# Patient Record
Sex: FEMALE | Race: BLACK OR AFRICAN AMERICAN | NOT HISPANIC OR LATINO | URBAN - METROPOLITAN AREA
[De-identification: names, ages, dates, MRNs, and addresses within clinical notes are randomized per-mention and may not be internally consistent; named-entity substitution may affect disease eponyms.]

---

## 2020-01-08 ENCOUNTER — APPOINTMENT (RX ONLY)
Dept: URBAN - METROPOLITAN AREA OTHER 10 | Facility: OTHER | Age: 70
Setting detail: DERMATOLOGY
End: 2020-01-08

## 2020-01-08 DIAGNOSIS — D485 NEOPLASM OF UNCERTAIN BEHAVIOR OF SKIN: ICD-10-CM

## 2020-01-08 PROBLEM — D48.5 NEOPLASM OF UNCERTAIN BEHAVIOR OF SKIN: Status: ACTIVE | Noted: 2020-01-08

## 2020-01-08 PROBLEM — K21.9 GASTRO-ESOPHAGEAL REFLUX DISEASE WITHOUT ESOPHAGITIS: Status: ACTIVE | Noted: 2020-01-08

## 2020-01-08 PROBLEM — I10 ESSENTIAL (PRIMARY) HYPERTENSION: Status: ACTIVE | Noted: 2020-01-08

## 2020-01-08 PROCEDURE — ? BIOPSY BY SHAVE METHOD

## 2020-01-08 PROCEDURE — 11102 TANGNTL BX SKIN SINGLE LES: CPT

## 2020-01-08 ASSESSMENT — LOCATION ZONE DERM: LOCATION ZONE: LIP

## 2020-01-08 ASSESSMENT — LOCATION SIMPLE DESCRIPTION DERM: LOCATION SIMPLE: RIGHT LIP

## 2020-01-08 ASSESSMENT — LOCATION DETAILED DESCRIPTION DERM: LOCATION DETAILED: RIGHT UPPER CUTANEOUS LIP

## 2020-01-08 NOTE — PROCEDURE: BIOPSY BY SHAVE METHOD
Hide Topical Anesthesia?: No
Curettage Text: The wound bed was treated with curettage after the biopsy was performed.
Biopsy Method: Dermablade
Hemostasis: Aluminum Chloride and Electrocautery
Wound Care: No ointment
Lab Facility: [id number]
Additional Anesthesia Volume In Cc (Will Not Render If 0): 0
Cryotherapy Text: The wound bed was treated with cryotherapy after the biopsy was performed.
Billing Type: Patient Bill
Lab: [id number]
Depth Of Biopsy: dermis
Anesthesia Type: 1% lidocaine with 1:200,000 epinephrine
Anesthesia Volume In Cc: 0.3
Type Of Destruction Used: Curettage
Electrodesiccation And Curettage Text: The wound bed was treated with electrodesiccation and curettage after the biopsy was performed.
Notification Instructions: Patient will be notified of biopsy results. However, patient instructed to call the office if not contacted within 2 weeks.
Biopsy Type: H and E
Electrodesiccation Text: The wound bed was treated with electrodesiccation after the biopsy was performed.
Post-Care Instructions: I reviewed with the patient in detail post-care instructions. Patient is to keep the biopsy site dry overnight, and then apply bacitracin twice daily until healed. Patient may apply hydrogen peroxide soaks to remove any crusting.
Silver Nitrate Text: The wound bed was treated with silver nitrate after the biopsy was performed.
Was A Bandage Applied: Yes
Dressing: bandage
Consent: Written consent was obtained and risks were reviewed including but not limited to scarring, infection, bleeding, scabbing, incomplete removal, nerve damage and allergy to anesthesia.
Detail Level: Detailed

## 2020-01-29 ENCOUNTER — APPOINTMENT (RX ONLY)
Dept: URBAN - METROPOLITAN AREA CLINIC 12 | Facility: CLINIC | Age: 70
Setting detail: DERMATOLOGY
End: 2020-01-29

## 2020-01-29 PROBLEM — C44.01 BASAL CELL CARCINOMA OF SKIN OF LIP: Status: ACTIVE | Noted: 2020-01-29

## 2020-01-29 PROCEDURE — ? OTHER (COSMETIC)

## 2020-01-29 PROCEDURE — ? PATIENT SPECIFIC COUNSELING

## 2020-01-29 PROCEDURE — ? COUNSELING - BCC

## 2020-01-29 NOTE — PROCEDURE: PATIENT SPECIFIC COUNSELING
Detail Level: Zone
Other (Free Text): Dr. Walls requested copy of lab results from pcp \\n\\n\\nPatient presents with nasal cell on her upper lip patient is present with her family who state she does not have insurance and would like to get a cash quote for the closure. Dr. Walls stepped in and explained that her procedure will be done in the office shortly after Dr. Garcia removes the cancer. Explained that he will not know exactly what he will do until presented with the defect explained that he  may be able to close her with a straight line or flap, explains that if the wound is much bigger, she may have to go to the operating room for a more invasive procedure. explained that her mouth may be a little bit smaller after the procedure is done, and discussed down time.  Explained that she will have a scar that will be socially noticeable. \\n\\nDana stepped in to give a quote.

## 2020-01-29 NOTE — HPI: MOHS RECONSTRUCTION SINGLE DEFECT EVALUATION
How Many Reconstruction Sites Are To Be Evaluated?: 1
Is This A New Presentation, Presentation For Surgery, Or A Follow-Up?: Mohs Reconstruction Consultation
What Is The Location Of This Site?: Upper lip

## 2020-01-29 NOTE — PROCEDURE: OTHER (COSMETIC)
Other (Free Text): Patient is self pay
Detail Level: Zone
Price $ (Use Numbers Only, No Text Please.): 120.00

## 2020-02-04 ENCOUNTER — APPOINTMENT (RX ONLY)
Dept: OTHER 1 | Facility: OTHER | Age: 70
Setting detail: DERMATOLOGY
End: 2020-02-04

## 2020-02-10 ENCOUNTER — APPOINTMENT (RX ONLY)
Dept: URBAN - METROPOLITAN AREA OTHER 9 | Facility: OTHER | Age: 70
Setting detail: DERMATOLOGY
End: 2020-02-10

## 2020-02-10 PROBLEM — C44.01 BASAL CELL CARCINOMA OF SKIN OF LIP: Status: ACTIVE | Noted: 2020-02-10

## 2020-02-10 PROCEDURE — ? TREATMENT REGIMEN

## 2020-02-10 PROCEDURE — 99024 POSTOP FOLLOW-UP VISIT: CPT

## 2020-02-10 PROCEDURE — ? CONSULTATION FOR MOHS SURGERY

## 2020-02-10 NOTE — HPI: MOHS SURGERY CONSULTATION
Has The Cancer Been Biopsied Before?: has been previously biopsied
Body Location Override (Optional): Right upper cutaneous lip
Accession (Optional): [id number]
Who Is Your Referring Provider?: Tita Rosales
When Was Your Biopsy?: 1/8/20

## 2020-02-10 NOTE — PROCEDURE: TREATMENT REGIMEN
Detail Level: Simple
Plan: Discussed treatment options. Advised patient that [facility] will need to verify insurance, before treatment. Possible MOHs  with  Dr Garcia and closure with Dr Walls. Option # 2 Dr Walls will do

## 2020-02-10 NOTE — PROCEDURE: CONSULTATION FOR MOHS SURGERY
Detail Level: Detailed
Size Of Lesion: 2.5
X Size Of Lesion In Cm (Optional): 3
Incorporate Mauc In Note: Yes

## 2020-03-05 ENCOUNTER — RX ONLY (OUTPATIENT)
Age: 70
Setting detail: RX ONLY
End: 2020-03-05

## 2020-03-09 ENCOUNTER — APPOINTMENT (RX ONLY)
Dept: URBAN - METROPOLITAN AREA CLINIC 12 | Facility: CLINIC | Age: 70
Setting detail: DERMATOLOGY
End: 2020-03-09

## 2020-03-09 DIAGNOSIS — Z48.89 ENCOUNTER FOR OTHER SPECIFIED SURGICAL AFTERCARE: ICD-10-CM

## 2020-03-09 PROCEDURE — ? COUNSELING - POST-OP CHECK

## 2020-03-09 PROCEDURE — 99024 POSTOP FOLLOW-UP VISIT: CPT

## 2020-03-09 PROCEDURE — ? POST-OP CHECK

## 2020-03-09 PROCEDURE — ? PATIENT SPECIFIC COUNSELING

## 2020-03-09 ASSESSMENT — LOCATION DETAILED DESCRIPTION DERM: LOCATION DETAILED: PHILTRUM

## 2020-03-09 ASSESSMENT — LOCATION ZONE DERM: LOCATION ZONE: LIP

## 2020-03-09 ASSESSMENT — LOCATION SIMPLE DESCRIPTION DERM: LOCATION SIMPLE: UPPER LIP

## 2020-03-09 NOTE — PROCEDURE: PATIENT SPECIFIC COUNSELING
Detail Level: Simple
Other (Free Text): Patient presents s/p basal cell carcinoma repair on the upper lip. Dr. Walls stated that everything looked good, with no signs of bleeding or infection. Dr. Walls advised the patient to apply Vaseline to the area everyday. The patIent is to RTC in 1 week for suture removal and further evaluation and management. The patient has subsequent surgery schedule on April 5th.

## 2020-03-16 ENCOUNTER — APPOINTMENT (RX ONLY)
Dept: URBAN - METROPOLITAN AREA CLINIC 12 | Facility: CLINIC | Age: 70
Setting detail: DERMATOLOGY
End: 2020-03-16

## 2020-03-16 DIAGNOSIS — Z48.89 ENCOUNTER FOR OTHER SPECIFIED SURGICAL AFTERCARE: ICD-10-CM

## 2020-03-16 PROCEDURE — ? COUNSELING - POST-OP CHECK

## 2020-03-16 PROCEDURE — ? POST-OP CHECK

## 2020-03-16 PROCEDURE — ? PATIENT SPECIFIC COUNSELING

## 2020-03-16 PROCEDURE — ? SUTURE REMOVAL

## 2020-03-16 PROCEDURE — 99024 POSTOP FOLLOW-UP VISIT: CPT

## 2020-03-16 ASSESSMENT — LOCATION DETAILED DESCRIPTION DERM
LOCATION DETAILED: RIGHT UPPER CUTANEOUS LIP
LOCATION DETAILED: PHILTRUM

## 2020-03-16 ASSESSMENT — LOCATION ZONE DERM: LOCATION ZONE: LIP

## 2020-03-16 ASSESSMENT — LOCATION SIMPLE DESCRIPTION DERM
LOCATION SIMPLE: UPPER LIP
LOCATION SIMPLE: RIGHT LIP

## 2020-03-16 NOTE — PROCEDURE: PATIENT SPECIFIC COUNSELING
Detail Level: Simple
Other (Free Text): Patient presents s/p basal cell carcinoma repair on the upper lip. Dr. Walls stated that everything looked good, with no signs of bleeding or infection. Sutures removed today. Photo obtained. Will schedule next surgery in 2 weeks barring any unforeseen coronavirus delays.

## 2020-04-09 ENCOUNTER — APPOINTMENT (RX ONLY)
Dept: URBAN - METROPOLITAN AREA CLINIC 12 | Facility: CLINIC | Age: 70
Setting detail: DERMATOLOGY
End: 2020-04-09

## 2020-04-09 DIAGNOSIS — Z48.89 ENCOUNTER FOR OTHER SPECIFIED SURGICAL AFTERCARE: ICD-10-CM

## 2020-04-09 PROCEDURE — ? PATIENT SPECIFIC COUNSELING

## 2020-04-09 PROCEDURE — ? POST-OP CHECK

## 2020-04-09 PROCEDURE — ? COUNSELING - POST-OP CHECK

## 2020-04-09 PROCEDURE — ? SUTURE REMOVAL

## 2020-04-09 PROCEDURE — 99024 POSTOP FOLLOW-UP VISIT: CPT

## 2020-04-09 ASSESSMENT — LOCATION SIMPLE DESCRIPTION DERM
LOCATION SIMPLE: RIGHT LIP
LOCATION SIMPLE: UPPER LIP

## 2020-04-09 ASSESSMENT — LOCATION DETAILED DESCRIPTION DERM
LOCATION DETAILED: RIGHT UPPER CUTANEOUS LIP
LOCATION DETAILED: PHILTRUM

## 2020-04-09 ASSESSMENT — LOCATION ZONE DERM: LOCATION ZONE: LIP

## 2020-04-09 NOTE — PROCEDURE: PATIENT SPECIFIC COUNSELING
Other (Free Text): Patient presents s/p basal cell carcinoma repair on the upper lip. Patient is present with her son who is translating. Dr. Walls explained that everything looks good, patient has a normal amount of swelling, patient made aware that all her sutures are dissolvable advised to use ice to help with swelling. Tylenol to help with pain and peridex swish and spit to help keep her mouth clean. Patient advised to follow up with us in 2 weeks.
Detail Level: Simple

## 2020-04-28 ENCOUNTER — APPOINTMENT (RX ONLY)
Dept: URBAN - METROPOLITAN AREA CLINIC 12 | Facility: CLINIC | Age: 70
Setting detail: DERMATOLOGY
End: 2020-04-28

## 2020-04-28 DIAGNOSIS — Z48.89 ENCOUNTER FOR OTHER SPECIFIED SURGICAL AFTERCARE: ICD-10-CM

## 2020-04-28 PROCEDURE — ? PATIENT SPECIFIC COUNSELING

## 2020-04-28 PROCEDURE — ? COUNSELING - POST-OP CHECK

## 2020-04-28 PROCEDURE — ? POST-OP CHECK

## 2020-04-28 PROCEDURE — 99024 POSTOP FOLLOW-UP VISIT: CPT

## 2020-04-28 PROCEDURE — ? SUTURE REMOVAL

## 2020-04-28 ASSESSMENT — LOCATION SIMPLE DESCRIPTION DERM
LOCATION SIMPLE: UPPER LIP
LOCATION SIMPLE: RIGHT LIP

## 2020-04-28 ASSESSMENT — LOCATION DETAILED DESCRIPTION DERM
LOCATION DETAILED: RIGHT UPPER CUTANEOUS LIP
LOCATION DETAILED: PHILTRUM

## 2020-04-28 ASSESSMENT — LOCATION ZONE DERM: LOCATION ZONE: LIP

## 2020-04-28 NOTE — PROCEDURE: PATIENT SPECIFIC COUNSELING
Other (Free Text): Patient presents s/p basal cell carcinoma repair on the upper lip. Patient is present with her son who is translating. Son states she been experiencing some mild pain. Dr. Walls explained that everything looks great and healing well. Reassured that her pain level is normal and will take time to resolve, advised patient to start massaging 4 min 3x a day. Explained that at her next visit, we may treat her with steroids to help loosen up the scar tissue which will help resolve the pain. Patient will follow up in 1 month.  \\n\\nPatient verbalized understanding.
Detail Level: Simple

## 2020-06-02 ENCOUNTER — APPOINTMENT (RX ONLY)
Dept: URBAN - METROPOLITAN AREA CLINIC 12 | Facility: CLINIC | Age: 70
Setting detail: DERMATOLOGY
End: 2020-06-02

## 2020-06-02 DIAGNOSIS — Z48.89 ENCOUNTER FOR OTHER SPECIFIED SURGICAL AFTERCARE: ICD-10-CM

## 2020-06-02 PROCEDURE — 11900 INJECT SKIN LESIONS </W 7: CPT

## 2020-06-02 PROCEDURE — ? COUNSELING - POST-OP CHECK

## 2020-06-02 PROCEDURE — 99024 POSTOP FOLLOW-UP VISIT: CPT

## 2020-06-02 PROCEDURE — ? PATIENT SPECIFIC COUNSELING

## 2020-06-02 PROCEDURE — ? INTRALESIONAL KENALOG

## 2020-06-02 PROCEDURE — ? POST-OP CHECK

## 2020-06-02 ASSESSMENT — LOCATION SIMPLE DESCRIPTION DERM
LOCATION SIMPLE: RIGHT LIP
LOCATION SIMPLE: UPPER LIP
LOCATION SIMPLE: RIGHT UPPER LIP

## 2020-06-02 ASSESSMENT — LOCATION ZONE DERM: LOCATION ZONE: LIP

## 2020-06-02 ASSESSMENT — LOCATION DETAILED DESCRIPTION DERM
LOCATION DETAILED: RIGHT UPPER CUTANEOUS LIP
LOCATION DETAILED: RIGHT SUPERIOR VERMILION BORDER
LOCATION DETAILED: PHILTRUM

## 2020-06-02 NOTE — PROCEDURE: PATIENT SPECIFIC COUNSELING
Other (Free Text): Patient presents s/p basal cell carcinoma repair on the upper lip. Patient is present with her son who states she’s been experiencing pain on her upper and lower lip. No issues eating and drinking. Dr. Titi rosario Ed and explained that her pain is caused by thick scar tissue that needs treatment with Kenalog to help soften the scar tissue. Patient was treated with Kenalog 10 ( derma jet ) 5 pumps to upper lip. Then Kenalog 20 1:1 with saline to upper lip deep tissue. Patient tolerated the treatment well. Patient will follow up in 3 weeks.
Detail Level: Simple

## 2020-06-02 NOTE — PROCEDURE: INTRALESIONAL KENALOG
Detail Level: Detailed
Kenalog Preparation: kenalog
Concentration (Mg/Ml): 10
Total Volume (Ccs): 0.3
Lot # (Optional): [id number]
Expiration Date (Optional): 05/2021
Administered By (Optional): Dr. Walls
Consent: The risks of atrophy were reviewed with the patient.
Concentration (Mg/Ml): 20
Total Volume (Ccs): 0.6

## 2020-06-23 ENCOUNTER — APPOINTMENT (RX ONLY)
Dept: URBAN - METROPOLITAN AREA CLINIC 12 | Facility: CLINIC | Age: 70
Setting detail: DERMATOLOGY
End: 2020-06-23

## 2020-06-23 DIAGNOSIS — Z48.89 ENCOUNTER FOR OTHER SPECIFIED SURGICAL AFTERCARE: ICD-10-CM

## 2020-06-23 PROCEDURE — ? PATIENT SPECIFIC COUNSELING

## 2020-06-23 PROCEDURE — 11900 INJECT SKIN LESIONS </W 7: CPT

## 2020-06-23 PROCEDURE — ? INTRALESIONAL KENALOG

## 2020-06-23 PROCEDURE — 99024 POSTOP FOLLOW-UP VISIT: CPT

## 2020-06-23 PROCEDURE — ? COUNSELING - POST-OP CHECK

## 2020-06-23 PROCEDURE — ? POST-OP CHECK

## 2020-06-23 ASSESSMENT — LOCATION DETAILED DESCRIPTION DERM
LOCATION DETAILED: RIGHT LOWER CUTANEOUS LIP
LOCATION DETAILED: RIGHT SUPERIOR VERMILION BORDER
LOCATION DETAILED: PHILTRUM
LOCATION DETAILED: RIGHT UPPER CUTANEOUS LIP

## 2020-06-23 ASSESSMENT — LOCATION SIMPLE DESCRIPTION DERM
LOCATION SIMPLE: RIGHT UPPER LIP
LOCATION SIMPLE: UPPER LIP
LOCATION SIMPLE: RIGHT LIP

## 2020-06-23 ASSESSMENT — LOCATION ZONE DERM: LOCATION ZONE: LIP

## 2020-06-23 NOTE — PROCEDURE: PATIENT SPECIFIC COUNSELING
Other (Free Text): Patient presents s/p basal cell carcinoma repair on the upper lip. Patient is present with her son who states she’s been experiencing pain on her upper and lower lip. No issues eating and drinking. Dr. Walls recommended treating her with Kenalog 40 straight on her upper and lower lip. 6 pumps total. Dr. Walls advised patient to start wearing silicon gel sheeting on the upper and lower lip at night.
Detail Level: Simple

## 2020-06-23 NOTE — PROCEDURE: INTRALESIONAL KENALOG
Detail Level: Detailed
Kenalog Preparation: kenalog
Concentration (Mg/Ml): 10
Total Volume (Ccs): 0.3
Lot # (Optional): [id number]
Expiration Date (Optional): 05/2021
Administered By (Optional): Dr. Wlals
Consent: The risks of atrophy were reviewed with the patient.
Concentration (Mg/Ml): 40
Total Volume (Ccs): 1

## 2020-07-21 ENCOUNTER — APPOINTMENT (RX ONLY)
Dept: URBAN - METROPOLITAN AREA CLINIC 12 | Facility: CLINIC | Age: 70
Setting detail: DERMATOLOGY
End: 2020-07-21

## 2020-07-21 DIAGNOSIS — Z48.89 ENCOUNTER FOR OTHER SPECIFIED SURGICAL AFTERCARE: ICD-10-CM

## 2020-07-21 PROCEDURE — ? POST-OP CHECK

## 2020-07-21 PROCEDURE — 11900 INJECT SKIN LESIONS </W 7: CPT

## 2020-07-21 PROCEDURE — ? PATIENT SPECIFIC COUNSELING

## 2020-07-21 PROCEDURE — 99024 POSTOP FOLLOW-UP VISIT: CPT

## 2020-07-21 PROCEDURE — ? COUNSELING - POST-OP CHECK

## 2020-07-21 PROCEDURE — ? INTRALESIONAL KENALOG

## 2020-07-21 ASSESSMENT — LOCATION DETAILED DESCRIPTION DERM
LOCATION DETAILED: PHILTRUM
LOCATION DETAILED: RIGHT SUPERIOR VERMILION BORDER
LOCATION DETAILED: RIGHT LOWER CUTANEOUS LIP
LOCATION DETAILED: RIGHT UPPER CUTANEOUS LIP

## 2020-07-21 ASSESSMENT — LOCATION SIMPLE DESCRIPTION DERM
LOCATION SIMPLE: RIGHT UPPER LIP
LOCATION SIMPLE: RIGHT LIP
LOCATION SIMPLE: UPPER LIP

## 2020-07-21 ASSESSMENT — LOCATION ZONE DERM: LOCATION ZONE: LIP

## 2020-07-21 NOTE — PROCEDURE: INTRALESIONAL KENALOG
Detail Level: Detailed
Kenalog Preparation: kenalog
Concentration (Mg/Ml): 10
Total Volume (Ccs): 0.3
Lot # (Optional): [id number]
Expiration Date (Optional): 05/2021
Administered By (Optional): Dr. Walls
Consent: The risks of atrophy were reviewed with the patient.
Concentration (Mg/Ml): 40
Total Volume (Ccs): 1

## 2020-07-21 NOTE — PROCEDURE: PATIENT SPECIFIC COUNSELING
Other (Free Text): Patient presents s/p basal cell carcinoma repair on the upper lip. Patient is present with her son who states she’s been experiencing pain on her upper and lower lip. No issues eating and drinking. Dr. Walls recommended treating her with Kenalog 40 straight on her upper and lower lip. [address] pumps [address] Dr. Walls advised patient return in 3 weeks.
Detail Level: Simple

## 2020-08-18 ENCOUNTER — APPOINTMENT (RX ONLY)
Dept: URBAN - METROPOLITAN AREA CLINIC 12 | Facility: CLINIC | Age: 70
Setting detail: DERMATOLOGY
End: 2020-08-18

## 2020-08-18 DIAGNOSIS — Z48.89 ENCOUNTER FOR OTHER SPECIFIED SURGICAL AFTERCARE: ICD-10-CM

## 2020-08-18 PROCEDURE — ? PATIENT SPECIFIC COUNSELING

## 2020-08-18 PROCEDURE — ? POST-OP CHECK

## 2020-08-18 PROCEDURE — ? COUNSELING - POST-OP CHECK

## 2020-08-18 PROCEDURE — ? PHOTOS OBTAINED

## 2020-08-18 PROCEDURE — ? INTRALESIONAL KENALOG (COSMETIC)

## 2020-08-18 PROCEDURE — 99024 POSTOP FOLLOW-UP VISIT: CPT

## 2020-08-18 PROCEDURE — ? OTHER (COSMETIC)

## 2020-08-18 ASSESSMENT — LOCATION ZONE DERM: LOCATION ZONE: LIP

## 2020-08-18 ASSESSMENT — LOCATION DETAILED DESCRIPTION DERM
LOCATION DETAILED: RIGHT UPPER CUTANEOUS LIP
LOCATION DETAILED: PHILTRUM

## 2020-08-18 ASSESSMENT — LOCATION SIMPLE DESCRIPTION DERM
LOCATION SIMPLE: RIGHT LIP
LOCATION SIMPLE: UPPER LIP

## 2020-08-18 NOTE — PROCEDURE: INTRALESIONAL KENALOG (COSMETIC)
Administered By (Optional): Dr. Walls
Kenalog Preparation: kenalog
Total Volume (Ccs): 1
Expiration Date (Optional): 09/2021
Consent: The risks of atrophy were reviewed with the patient.
Price (Use Numbers Only, No Special Characters Or $): 0.00
Detail Level: Detailed
Concentration (Mg/Ml): 20
Lot # (Optional): [id number]

## 2020-08-18 NOTE — PROCEDURE: PATIENT SPECIFIC COUNSELING
Other (Free Text): Patient presents s/p basal cell carcinoma repair on the upper lip. Patient is present with her son who states she continues to experiencing pain on her upper and lower lip. Still no issues eating and drinking. Photo was taken today of area. Dr. Walls examined patient and reports the keloid has reduced in size and will continue to go down with steroid treatments. Dr. Walls recommended treating patient with Kenalog 20, 2cc in Dermajet,straight on her upper and lower lip. 6 pumps on upper lip and 4 pumps on lower lip. Dr. Walls advised patient return in 1 month.
Detail Level: Simple

## 2020-08-18 NOTE — PROCEDURE: OTHER (COSMETIC)
Sticky Other (Free Text): Per. Dr. Walls no charge
Other (Free Text): Dermajet Kenalog 20 Steroid injection per Dr. Walls no charge
Price $ (Use Numbers Only, No Text Please.): 0.00
Detail Level: Zone

## 2020-09-22 ENCOUNTER — RX ONLY (OUTPATIENT)
Age: 70
Setting detail: RX ONLY
End: 2020-09-22

## 2020-09-22 ENCOUNTER — APPOINTMENT (RX ONLY)
Dept: URBAN - METROPOLITAN AREA CLINIC 12 | Facility: CLINIC | Age: 70
Setting detail: DERMATOLOGY
End: 2020-09-22

## 2020-09-22 DIAGNOSIS — Z48.89 ENCOUNTER FOR OTHER SPECIFIED SURGICAL AFTERCARE: ICD-10-CM

## 2020-09-22 PROCEDURE — ? PHOTOS OBTAINED

## 2020-09-22 PROCEDURE — ? OTHER (COSMETIC)

## 2020-09-22 PROCEDURE — 99024 POSTOP FOLLOW-UP VISIT: CPT

## 2020-09-22 PROCEDURE — ? COUNSELING - POST-OP CHECK

## 2020-09-22 PROCEDURE — ? PATIENT SPECIFIC COUNSELING

## 2020-09-22 PROCEDURE — ? INTRALESIONAL KENALOG (COSMETIC)

## 2020-09-22 PROCEDURE — ? POST-OP CHECK

## 2020-09-22 RX ORDER — GABAPENTIN 300 MG/1
CAPSULE ORAL
Qty: 90 | Refills: 3 | Status: ERX | COMMUNITY
Start: 2020-09-22

## 2020-09-22 ASSESSMENT — LOCATION DETAILED DESCRIPTION DERM
LOCATION DETAILED: RIGHT UPPER CUTANEOUS LIP
LOCATION DETAILED: PHILTRUM

## 2020-09-22 ASSESSMENT — LOCATION SIMPLE DESCRIPTION DERM
LOCATION SIMPLE: UPPER LIP
LOCATION SIMPLE: RIGHT LIP

## 2020-09-22 ASSESSMENT — LOCATION ZONE DERM: LOCATION ZONE: LIP

## 2020-09-22 NOTE — PROCEDURE: INTRALESIONAL KENALOG (COSMETIC)
Administered By (Optional): Dr. Walls
Kenalog Preparation: kenalog
Total Volume (Ccs): 2
Expiration Date (Optional): 10/2021
Consent: The risks of atrophy were reviewed with the patient.
Price (Use Numbers Only, No Special Characters Or $): 0.00
Detail Level: Detailed
Concentration (Mg/Ml): 20
Lot # (Optional): [id number]

## 2020-09-22 NOTE — PROCEDURE: PATIENT SPECIFIC COUNSELING
Other (Free Text): Patient presents today for follow up on steroid injection and postop check for basal cell carcinoma repair on the upper lip. Patient is present with her son who states she has continue pain which is increasing over time. Patient reports an itching and stinging sensation. Photo was taken today of area. Dr. Walls examined patient and reports the keloid continues has reduced in size and is improving, a prescription was written for 300mg of Neurontin for the patient to help relive nerve pain. Patient and son verbalized understanding and consent was signed. Dr. Walls treated patient with Kenalog 20, 2cc in Dermajet,straight on her upper and lower lip. 3 pumps on upper lip and 2 pumps on lower lip. Dr. Walls advised patient return in 2 month.
Detail Level: Simple

## 2020-12-01 ENCOUNTER — APPOINTMENT (RX ONLY)
Dept: URBAN - METROPOLITAN AREA CLINIC 12 | Facility: CLINIC | Age: 70
Setting detail: DERMATOLOGY
End: 2020-12-01

## 2020-12-01 DIAGNOSIS — Z48.89 ENCOUNTER FOR OTHER SPECIFIED SURGICAL AFTERCARE: ICD-10-CM

## 2020-12-01 PROCEDURE — ? PHOTOS OBTAINED

## 2020-12-01 PROCEDURE — ? PATIENT SPECIFIC COUNSELING

## 2020-12-01 PROCEDURE — ? POST-OP CHECK

## 2020-12-01 PROCEDURE — ? COUNSELING - POST-OP CHECK

## 2020-12-01 PROCEDURE — 99024 POSTOP FOLLOW-UP VISIT: CPT

## 2020-12-01 PROCEDURE — ? INTRALESIONAL KENALOG (COSMETIC)

## 2020-12-01 PROCEDURE — ? OTHER (COSMETIC)

## 2020-12-01 ASSESSMENT — LOCATION DETAILED DESCRIPTION DERM
LOCATION DETAILED: PHILTRUM
LOCATION DETAILED: RIGHT UPPER CUTANEOUS LIP

## 2020-12-01 ASSESSMENT — LOCATION SIMPLE DESCRIPTION DERM
LOCATION SIMPLE: UPPER LIP
LOCATION SIMPLE: RIGHT LIP

## 2020-12-01 ASSESSMENT — LOCATION ZONE DERM: LOCATION ZONE: LIP

## 2020-12-01 NOTE — PROCEDURE: INTRALESIONAL KENALOG (COSMETIC)
Administered By (Optional): Dr. Walls
Kenalog Preparation: kenalog
Total Volume (Ccs): 2
Expiration Date (Optional): 10/2021
Consent: The risks of atrophy were reviewed with the patient.
Price (Use Numbers Only, No Special Characters Or $): 0.00
Detail Level: Detailed
Concentration (Mg/Ml): 20
Lot # (Optional): [id number]

## 2020-12-01 NOTE — PROCEDURE: PATIENT SPECIFIC COUNSELING
Other (Free Text): Patient presents today for follow up on steroid injection and postop check for basal cell carcinoma repair on the upper lip. Patient is present with her son who states she has continue pain which is increasing over time. Dr. Walls explained there is much improvement from the last treatment. Recommended treating her with Kenalog 20 using the derma jet again. Explained that this treatment should take care of her keloid and Dr. Walls discharged patient from further follow ups. Patient verbalized understanding and advised to RTC PRN.
Detail Level: Simple

## 2021-05-18 ENCOUNTER — APPOINTMENT (RX ONLY)
Dept: URBAN - METROPOLITAN AREA CLINIC 12 | Facility: CLINIC | Age: 71
Setting detail: DERMATOLOGY
End: 2021-05-18

## 2021-05-18 DIAGNOSIS — Z48.89 ENCOUNTER FOR OTHER SPECIFIED SURGICAL AFTERCARE: ICD-10-CM

## 2021-05-18 PROCEDURE — 99024 POSTOP FOLLOW-UP VISIT: CPT

## 2021-05-18 PROCEDURE — ? COUNSELING - POST-OP CHECK

## 2021-05-18 PROCEDURE — ? PATIENT SPECIFIC COUNSELING

## 2021-05-18 PROCEDURE — ? PHOTOS OBTAINED

## 2021-05-18 PROCEDURE — ? POST-OP CHECK

## 2021-05-18 ASSESSMENT — LOCATION ZONE DERM: LOCATION ZONE: LIP

## 2021-05-18 ASSESSMENT — LOCATION SIMPLE DESCRIPTION DERM: LOCATION SIMPLE: UPPER LIP

## 2021-05-18 ASSESSMENT — LOCATION DETAILED DESCRIPTION DERM: LOCATION DETAILED: PHILTRUM

## 2021-05-18 NOTE — PROCEDURE: PATIENT SPECIFIC COUNSELING
Other (Free Text): Patient present today for post op check. Patient reports no problems with healing except for sensitivity in area. Dr. Walls examined patient and voiced everything looks good and is healing well. Dr. Walls voiced that the scar tissue alphas reduced greatly. Dr. Walls voiced that no more kenalog injections will be needed. Patient advised to follow up with family medicine physician for check up and review of medical history. Patient verbalized agreement and understanding. Patient advised to follow up as needed.
Detail Level: Simple

## 2023-11-02 ENCOUNTER — P2P PATIENT RECORD (OUTPATIENT)
Age: 73
End: 2023-11-02

## 2024-01-11 ENCOUNTER — OFFICE VISIT (OUTPATIENT)
Dept: URBAN - METROPOLITAN AREA CLINIC 50 | Facility: CLINIC | Age: 74
End: 2024-01-11

## 2024-03-07 ENCOUNTER — OV EP (OUTPATIENT)
Dept: URBAN - METROPOLITAN AREA CLINIC 50 | Facility: CLINIC | Age: 74
End: 2024-03-07

## 2024-04-22 ENCOUNTER — OV NP (OUTPATIENT)
Dept: URBAN - METROPOLITAN AREA CLINIC 96 | Facility: CLINIC | Age: 74
End: 2024-04-22